# Patient Record
(demographics unavailable — no encounter records)

---

## 2025-02-20 NOTE — HISTORY OF PRESENT ILLNESS
[FreeTextEntry1] : S/P L PMX w/ NL (1/6/23): No resid DCIS. 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 Ant 70) +FH Ov Ca (M. Aunt) S/P REJI/BSO (2017)(Vivek) > endom th/bleeding > Benign Pt fell (6/24) > L AC tear/elbow fx > L AC repair (7/2/24) > +sig L UE ROM limitation > to start PT Colonoscopy (1/24): "+polyps" > 5yrs  Had COVID (1/21) > recovered NOT vaccinated No Breast Surgery, Breast Procedures or Nipple Discharge.  No MH/FH changes. Taking Ca/D. ROS reviewed/discussed. Last Bone Densitometry  Mammo/Sono (12/20/24): fatty, NSF

## 2025-02-20 NOTE — PHYSICAL EXAM
[Normocephalic] : normocephalic [Atraumatic] : atraumatic [Supple] : supple [No Supraclavicular Adenopathy] : no supraclavicular adenopathy [No Cervical Adenopathy] : no cervical adenopathy [No Thyromegaly] : no thyromegaly [Normal Sinus Rhythm] : normal sinus rhythm [Examined in the supine and seated position] : examined in the supine and seated position [No dominant masses] : no dominant masses in right breast  [No dominant masses] : no dominant masses left breast [No Nipple Retraction] : no left nipple retraction [No Nipple Discharge] : no left nipple discharge [No Axillary Lymphadenopathy] : no left axillary lymphadenopathy [No Edema] : no edema [No Rashes] : no rashes [No Ulceration] : no ulceration [de-identified] : +FC tissue NSF  [de-identified] : S/P PMX. NER. %. No lymphedema.